# Patient Record
Sex: FEMALE | Race: WHITE | Employment: FULL TIME | URBAN - METROPOLITAN AREA
[De-identification: names, ages, dates, MRNs, and addresses within clinical notes are randomized per-mention and may not be internally consistent; named-entity substitution may affect disease eponyms.]

---

## 2017-01-30 PROBLEM — J32.9 RHINOSINUSITIS: Status: ACTIVE | Noted: 2017-01-30

## 2017-01-30 PROBLEM — H60.503 ACUTE OTITIS EXTERNA OF BOTH EARS: Status: ACTIVE | Noted: 2017-01-30

## 2017-02-17 PROBLEM — R04.0 EPISTAXIS: Status: ACTIVE | Noted: 2017-02-17

## 2017-05-22 PROBLEM — D64.9 ANEMIA: Status: ACTIVE | Noted: 2017-05-22

## 2017-06-08 PROBLEM — E78.00 PURE HYPERCHOLESTEROLEMIA: Status: ACTIVE | Noted: 2017-06-08

## 2017-06-08 PROBLEM — E11.9 TYPE 2 DIABETES MELLITUS WITHOUT COMPLICATION, WITHOUT LONG-TERM CURRENT USE OF INSULIN (HCC): Status: ACTIVE | Noted: 2017-06-08

## 2017-12-19 PROBLEM — E78.5 HYPERLIPIDEMIA: Status: ACTIVE | Noted: 2017-12-19

## 2018-01-18 PROBLEM — B02.9 HERPES ZOSTER WITHOUT COMPLICATION: Status: ACTIVE | Noted: 2018-01-18

## 2018-04-05 PROBLEM — B02.9 HERPES ZOSTER WITHOUT COMPLICATION: Status: RESOLVED | Noted: 2018-01-18 | Resolved: 2018-04-05

## 2018-04-05 PROBLEM — R04.0 EPISTAXIS: Status: RESOLVED | Noted: 2017-02-17 | Resolved: 2018-04-05

## 2018-11-09 PROBLEM — Z11.59 NEED FOR HEPATITIS C SCREENING TEST: Status: ACTIVE | Noted: 2018-11-09

## 2018-12-09 PROBLEM — Z11.59 NEED FOR HEPATITIS C SCREENING TEST: Status: RESOLVED | Noted: 2018-11-09 | Resolved: 2018-12-09

## 2019-02-19 PROBLEM — J32.9 RHINOSINUSITIS: Status: RESOLVED | Noted: 2017-01-30 | Resolved: 2019-02-19

## 2019-02-19 PROBLEM — R06.02 SOB (SHORTNESS OF BREATH): Status: ACTIVE | Noted: 2019-02-19

## 2019-02-19 PROBLEM — Z12.31 ENCOUNTER FOR SCREENING MAMMOGRAM FOR BREAST CANCER: Status: ACTIVE | Noted: 2019-02-19

## 2019-02-19 PROBLEM — Z12.4 SCREENING FOR CERVICAL CANCER: Status: ACTIVE | Noted: 2019-02-19

## 2019-02-19 PROBLEM — H60.503 ACUTE OTITIS EXTERNA OF BOTH EARS: Status: RESOLVED | Noted: 2017-01-30 | Resolved: 2019-02-19

## 2019-03-21 PROBLEM — Z12.31 ENCOUNTER FOR SCREENING MAMMOGRAM FOR BREAST CANCER: Status: RESOLVED | Noted: 2019-02-19 | Resolved: 2019-03-21

## 2019-03-21 PROBLEM — Z12.4 SCREENING FOR CERVICAL CANCER: Status: RESOLVED | Noted: 2019-02-19 | Resolved: 2019-03-21

## 2019-03-28 PROBLEM — J45.909 REACTIVE AIRWAY DISEASE WITHOUT COMPLICATION: Status: ACTIVE | Noted: 2019-03-28

## 2019-05-09 PROBLEM — E88.810 DYSMETABOLIC SYNDROME X: Status: ACTIVE | Noted: 2019-05-09

## 2019-05-09 PROBLEM — Z79.899 HIGH RISK MEDICATION USE: Status: ACTIVE | Noted: 2019-05-09

## 2019-09-20 PROBLEM — J01.01 ACUTE RECURRENT MAXILLARY SINUSITIS: Status: ACTIVE | Noted: 2019-09-20

## 2019-11-21 PROBLEM — Z00.00 MEDICARE ANNUAL WELLNESS VISIT, SUBSEQUENT: Status: ACTIVE | Noted: 2019-11-21

## 2019-12-21 PROBLEM — Z00.00 MEDICARE ANNUAL WELLNESS VISIT, SUBSEQUENT: Status: RESOLVED | Noted: 2019-11-21 | Resolved: 2019-12-21

## 2020-02-05 PROBLEM — M77.8 RIGHT SHOULDER TENDONITIS: Status: ACTIVE | Noted: 2020-02-05

## 2020-02-05 PROBLEM — M25.511 ACUTE PAIN OF RIGHT SHOULDER: Status: ACTIVE | Noted: 2020-02-05

## 2020-03-09 PROBLEM — M19.011 LOCALIZED OSTEOARTHRITIS OF RIGHT SHOULDER: Status: ACTIVE | Noted: 2020-03-09

## 2020-12-07 PROBLEM — E78.5 DYSLIPIDEMIA: Status: ACTIVE | Noted: 2020-12-07

## 2020-12-07 PROBLEM — Z12.31 ENCOUNTER FOR SCREENING MAMMOGRAM FOR MALIGNANT NEOPLASM OF BREAST: Status: ACTIVE | Noted: 2020-12-07

## 2021-01-06 PROBLEM — Z12.31 ENCOUNTER FOR SCREENING MAMMOGRAM FOR MALIGNANT NEOPLASM OF BREAST: Status: RESOLVED | Noted: 2020-12-07 | Resolved: 2021-01-06

## 2021-03-01 PROBLEM — D72.819 LEUCOPENIA: Status: ACTIVE | Noted: 2021-03-01

## 2021-03-01 PROBLEM — R80.9 MICROALBUMINURIA: Status: ACTIVE | Noted: 2021-03-01

## 2021-03-01 PROBLEM — D75.89 MACROCYTOSIS WITHOUT ANEMIA: Status: ACTIVE | Noted: 2021-03-01

## 2021-04-12 PROBLEM — J44.9 CHRONIC OBSTRUCTIVE PULMONARY DISEASE (HCC): Status: ACTIVE | Noted: 2021-04-12

## 2021-04-14 PROBLEM — R73.09 HIGH GLUCOSE: Status: ACTIVE | Noted: 2021-04-14

## 2021-04-14 PROBLEM — E88.819 INSULIN RESISTANCE: Status: ACTIVE | Noted: 2019-05-09

## 2021-05-12 PROBLEM — M62.838 CERVICAL PARASPINAL MUSCLE SPASM: Status: ACTIVE | Noted: 2021-05-12

## 2021-06-01 PROBLEM — J30.1 SEASONAL ALLERGIC RHINITIS DUE TO POLLEN: Status: ACTIVE | Noted: 2021-06-01

## 2021-07-06 PROBLEM — Z83.2 FAMILY HISTORY OF BLOOD COAGULATION DISORDER: Status: ACTIVE | Noted: 2021-07-06

## 2021-07-15 PROBLEM — R77.9 ELEVATED SERUM PROTEIN LEVEL: Status: ACTIVE | Noted: 2021-07-15

## 2021-12-01 PROBLEM — R19.7 DIARRHEA OF PRESUMED INFECTIOUS ORIGIN: Status: ACTIVE | Noted: 2021-12-01

## 2021-12-09 PROBLEM — Z23 NEED FOR SHINGLES VACCINE: Status: ACTIVE | Noted: 2021-12-09

## 2021-12-09 PROBLEM — Z00.00 ENCOUNTER FOR SUBSEQUENT ANNUAL WELLNESS VISIT (AWV) IN MEDICARE PATIENT: Status: ACTIVE | Noted: 2020-12-07

## 2022-09-29 PROBLEM — J30.9 ALLERGIC RHINITIS DUE TO ALLERGEN: Status: ACTIVE | Noted: 2022-09-29

## 2022-12-14 PROBLEM — Z13.31 DEPRESSION SCREEN: Status: ACTIVE | Noted: 2020-12-07

## 2022-12-14 PROBLEM — Z12.11 SCREEN FOR COLON CANCER: Status: ACTIVE | Noted: 2020-12-07

## 2022-12-23 PROBLEM — Z13.6 SCREENING FOR CARDIOVASCULAR CONDITION: Status: ACTIVE | Noted: 2020-12-07

## 2023-01-14 PROBLEM — Z00.00 MEDICARE ANNUAL WELLNESS VISIT, SUBSEQUENT: Status: RESOLVED | Noted: 2019-11-21 | Resolved: 2023-01-14

## 2023-01-22 PROBLEM — Z13.6 SCREENING FOR CARDIOVASCULAR CONDITION: Status: RESOLVED | Noted: 2020-12-07 | Resolved: 2023-01-22

## 2023-04-27 PROBLEM — H93.13 TINNITUS OF BOTH EARS: Status: ACTIVE | Noted: 2023-04-27

## 2023-07-06 PROBLEM — G89.29 CHRONIC BILATERAL LOW BACK PAIN WITHOUT SCIATICA: Status: ACTIVE | Noted: 2023-07-06

## 2023-07-06 PROBLEM — M54.50 CHRONIC BILATERAL LOW BACK PAIN WITHOUT SCIATICA: Status: ACTIVE | Noted: 2023-07-06

## 2023-09-07 PROBLEM — I71.43 INFRARENAL ABDOMINAL AORTIC ANEURYSM (AAA) WITHOUT RUPTURE (HCC): Status: ACTIVE | Noted: 2023-09-07

## 2023-09-19 PROBLEM — L30.9 DERMATITIS: Status: ACTIVE | Noted: 2023-09-19

## 2023-10-06 PROBLEM — M51.36 DDD (DEGENERATIVE DISC DISEASE), LUMBAR: Status: ACTIVE | Noted: 2023-10-06

## 2023-10-09 PROBLEM — Z13.820 SCREENING FOR OSTEOPOROSIS: Status: ACTIVE | Noted: 2020-12-07

## 2023-11-08 PROBLEM — Z13.820 SCREENING FOR OSTEOPOROSIS: Status: RESOLVED | Noted: 2020-12-07 | Resolved: 2023-11-08

## 2024-02-13 ENCOUNTER — HOSPITAL ENCOUNTER (EMERGENCY)
Age: 69
Discharge: HOME | End: 2024-02-13
Attending: EMERGENCY MEDICINE
Payer: COMMERCIAL

## 2024-02-13 ENCOUNTER — APPOINTMENT (OUTPATIENT)
Dept: CT IMAGING | Age: 69
End: 2024-02-13
Payer: COMMERCIAL

## 2024-02-13 VITALS
HEART RATE: 99 BPM | DIASTOLIC BLOOD PRESSURE: 83 MMHG | BODY MASS INDEX: 20.8 KG/M2 | TEMPERATURE: 98 F | SYSTOLIC BLOOD PRESSURE: 176 MMHG | OXYGEN SATURATION: 98 % | RESPIRATION RATE: 16 BRPM | WEIGHT: 125 LBS

## 2024-02-13 DIAGNOSIS — S32.020A COMPRESSION FRACTURE OF L2 VERTEBRA, INITIAL ENCOUNTER (HCC): ICD-10-CM
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
## 2024-02-13 DIAGNOSIS — M54.89 BACK PAIN DUE TO INFLAMMATORY PROCESS: Primary | ICD-10-CM

## 2024-02-13 PROCEDURE — 72131 CT LUMBAR SPINE W/O DYE: CPT

## 2024-02-13 PROCEDURE — 99284 EMERGENCY DEPT VISIT MOD MDM: CPT

## 2024-02-13 PROCEDURE — 96372 THER/PROPH/DIAG INJ SC/IM: CPT

## 2024-02-13 PROCEDURE — 6360000002 HC RX W HCPCS: Performed by: NURSE PRACTITIONER

## 2024-02-13 RX ORDER — DEXAMETHASONE 4 MG/1
8 TABLET ORAL ONCE
Status: COMPLETED | OUTPATIENT
Start: 2024-02-13 | End: 2024-02-13

## 2024-02-13 RX ORDER — KETOROLAC TROMETHAMINE 30 MG/ML
30 INJECTION, SOLUTION INTRAMUSCULAR; INTRAVENOUS ONCE
Status: COMPLETED | OUTPATIENT
Start: 2024-02-13 | End: 2024-02-13

## 2024-02-13 RX ORDER — HYDROCODONE BITARTRATE AND ACETAMINOPHEN 5; 325 MG/1; MG/1
1 TABLET ORAL EVERY 8 HOURS PRN
Qty: 15 TABLET | Refills: 0 | Status: SHIPPED | OUTPATIENT
Start: 2024-02-13 | End: 2024-02-18

## 2024-02-13 RX ADMIN — KETOROLAC TROMETHAMINE 30 MG: 30 INJECTION INTRAMUSCULAR; INTRAVENOUS at 10:12

## 2024-02-13 RX ADMIN — DEXAMETHASONE 8 MG: 4 TABLET ORAL at 10:14

## 2024-02-13 NOTE — ED PROVIDER NOTES
[facility] ED  eMERGENCY dEPARTMENT eNCOUnter      Pt Name: Lolis Vance  MRN: [id number]  Birthdate 1955  Date of evaluation: 2/13/2024  Provider: YOLANDA Crawford CNP    CHIEF COMPLAINT       Chief Complaint   Patient presents with    Fall     Denies blood thinners, denies LOC or hitting head    Back Pain         HISTORY OF PRESENT ILLNESS  (Location/Symptom, Timing/Onset, Context/Setting, Quality, Duration, Modifying Factors, Severity.)   Lolis Vance is a 68 y.o. female who presents to the emergency department. C/o pain across her lower back s/p fall Saturday 2/10/24. Reports falling backwards off of a chair. She was standing on the chair reaching for something. Denies hitting her head and LOC. Denies fever, chills, weakness, urinary sx. Denies saddle anesthesia. Denies change in bowel and/or bladder control. Rates her pain 8/10. She has been taking Tylenol.      Nursing Notes were reviewed.    ALLERGIES     Sulfa antibiotics and Gabapentin    CURRENT MEDICATIONS       Discharge Medication List as of 2/13/2024 12:04 PM        CONTINUE these medications which have NOT CHANGED    Details   amLODIPine (NORVASC) 5 MG tablet Take 1 tablet by mouth daily, Disp-90 tablet, R-0Normal      metFORMIN (GLUCOPHAGE-XR) 750 MG extended release tablet Take 1 tablet by mouth 2 times daily, Disp-180 tablet, R-0Normal      metoprolol succinate (TOPROL XL) 100 MG extended release tablet Take 1 tablet by mouth daily, Disp-90 tablet, R-0Normal      olmesartan (BENICAR) 40 MG tablet Take 1 tablet by mouth daily, Disp-90 tablet, R-0Normal      fluticasone (FLONASE) 50 MCG/ACT nasal spray 1 spray by Each Nostril route 2 times daily, Disp-3 each, R-0Normal      atorvastatin (LIPITOR) 10 MG tablet Take 1 tablet by mouth daily, Disp-90 tablet, R-0Normal      glycopyrrolate-formoterol (BEVESPI AEROSPHERE) 9-4.8 MCG/ACT AERO Inhale 2 puffs into the lungs 2 times daily, Disp-1 each, R-2Normal      triamcinolone